# Patient Record
Sex: MALE | Race: WHITE | NOT HISPANIC OR LATINO | Employment: OTHER | RURAL
[De-identification: names, ages, dates, MRNs, and addresses within clinical notes are randomized per-mention and may not be internally consistent; named-entity substitution may affect disease eponyms.]

---

## 2023-12-21 NOTE — PROGRESS NOTES
[facility]  [address], [address]  [place], [place], [address]    [phone]  WWW.[facility]           OUTPATIENT CLINIC CONSULTATION NOTE    Patient care team:  Patient Care Team:  Boris Phillips DO as PCP - General (Family Medicine)    Requesting Provider and Reason for consultation: The patient is being seen today at the request of Boris Phillips DO for cardiac murmur.     Subjective:   Chief complaint:   Chief Complaint   Patient presents with    Heart Murmur    Fatigue    Chest Pain    Shortness of Breath    Leg Swelling         Frank Lackey is a 68 y.o. male.  Cardiac focused problem list:  Cardiac murmur  Noted by primary care provider approximately 2021  Referred to cardiology 2024  Hypertension   Hypercholesterolemia   Type 2 diabetes mellitus   Degenerative disc disease, lower back pain   Depression   Former tobacco use, quit in the 1990s    HPI:    Patient presents today in consultation for cardiac murmur.     Murmur first noted around 3 years ago.  No additional workup recommended at that time.  Sounds like murmur was soft/mild.    Has chest pain a couple times a month, left-sided, can last a minute, feels heavy.    No unusual dyspnea, swelling, palpitations.  Activity level is limited by lower back pain/degenerative disc disease.  Not very active    Mother had heart failure, oldest brother had open heart surgery but reason is not known (possibly had a murmur since birth), sister had CABG in her 60s, another brother had CABG in his 60s    No prior echo, stress test    Review of Systems:  As noted above in the HPI    PFSH:  Patient Active Problem List   Diagnosis    Heart murmur         Current Outpatient Medications:     atorvastatin (LIPITOR) 40 MG tablet, Take 1 tablet by mouth Daily., Disp: , Rfl:     lisinopril (PRINIVIL,ZESTRIL) 2.5 MG tablet, Take 1 tablet by mouth Daily., Disp: , Rfl:     sertraline (ZOLOFT) 100 MG tablet, , Disp: , Rfl:  "    No Known Allergies    Social History     Socioeconomic History    Marital status:    Tobacco Use    Smoking status: Former     Packs/day: 2     Types: Cigarettes     Start date:      Quit date:      Years since quittin.0    Smokeless tobacco: Never   Vaping Use    Vaping Use: Never used   Substance and Sexual Activity    Alcohol use: Yes     Comment: rarely    Drug use: Never    Sexual activity: Defer     Family History   Problem Relation Age of Onset    Heart failure Mother     Liver cancer Father     Heart disease Sister     Liver cancer Brother     Heart disease Brother          Objective:   Physical Exam:  /80 (BP Location: Left arm, Patient Position: Sitting)   Pulse 79   Ht 167.6 cm (66\")   Wt 87.5 kg (193 lb)   SpO2 93%   BMI 31.15 kg/m²   CONSTITUTIONAL: No acute distress  RESPIRATORY: Normal effort. Clear to auscultation bilaterally without wheezing or rales  CARDIOVASCULAR: Regular rate and rhythm with normal S1 and S2.  HELLEN at RUSB with radiation to the carotids  PERIPHERAL VASCULAR:  Normal radial pulse. There is no lower extremity edema bilaterally.      Labs:  Labs reviewed by myself    No results found for: \"CHOL\"  No results found for: \"TRIG\"  No results found for: \"HDL\"  No results found for: \"LDL\"  No components found for: \"LDLDIRECTC\"    Diagnostic Data:      ECG 12 Lead    Date/Time: 2024 12:05 PM  Performed by: Enmanuel Mon MD    Authorized by: Enmanuel Mon MD  Previous ECG: no previous ECG available  Rhythm: sinus rhythm              Assessment and Plan:     Heart murmur  Possible bilateral carotid bruit versus radiating murmur  -Echo and carotid duplex ordered, [facility]    Precordial chest pain   -If echo without evidence of severe AS or other severe valvular abnormalities, will recommend Lexiscan nuclear stress test at [facility].  Not able to exercise on a treadmill due to degenerative disc disease, lower back pain    Mixed " hyperlipidemia  -Continue atorvastatin    Essential hypertension  -Continue low-dose lisinopril.  Blood pressure runs on the lower side at home and other recent office visits.  Will consider up titration if with signs of more persistent elevation      - Return in about 1 year (around 1/5/2025) for Next scheduled follow-up with an ECG.

## 2024-01-05 ENCOUNTER — OFFICE VISIT (OUTPATIENT)
Dept: CARDIOLOGY | Facility: CLINIC | Age: 69
End: 2024-01-05
Payer: MEDICARE

## 2024-01-05 ENCOUNTER — HOSPITAL ENCOUNTER (OUTPATIENT)
Facility: HOSPITAL | Age: 69
Discharge: HOME | End: 2024-01-05
Payer: MEDICARE

## 2024-01-05 VITALS
HEIGHT: 66 IN | BODY MASS INDEX: 31.02 KG/M2 | SYSTOLIC BLOOD PRESSURE: 142 MMHG | HEART RATE: 79 BPM | WEIGHT: 193 LBS | OXYGEN SATURATION: 93 % | DIASTOLIC BLOOD PRESSURE: 80 MMHG

## 2024-01-05 DIAGNOSIS — R07.2 PRECORDIAL CHEST PAIN: ICD-10-CM

## 2024-01-05 DIAGNOSIS — R01.1 HEART MURMUR: Primary | ICD-10-CM

## 2024-01-05 DIAGNOSIS — R09.89 BILATERAL CAROTID BRUITS: ICD-10-CM

## 2024-01-05 DIAGNOSIS — E78.2 MIXED HYPERLIPIDEMIA: ICD-10-CM

## 2024-01-05 PROCEDURE — 93005 ELECTROCARDIOGRAM TRACING: CPT

## 2024-01-05 RX ORDER — SERTRALINE HYDROCHLORIDE 100 MG/1
TABLET, FILM COATED ORAL
COMMUNITY
Start: 2023-10-20

## 2024-01-05 RX ORDER — ATORVASTATIN CALCIUM 40 MG/1
1 TABLET, FILM COATED ORAL DAILY
COMMUNITY
Start: 2023-12-04

## 2024-01-05 RX ORDER — LISINOPRIL 2.5 MG/1
1 TABLET ORAL DAILY
COMMUNITY
Start: 2023-11-20

## 2024-02-14 ENCOUNTER — TELEPHONE (OUTPATIENT)
Dept: CARDIOLOGY | Facility: CLINIC | Age: 69
End: 2024-02-14

## 2024-02-14 NOTE — TELEPHONE ENCOUNTER
Caller: MILLIE MEJIAS    Relationship: Emergency Contact    Best call back number: [phone]     What orders are you requesting (i.e. lab or imaging): ECHO AND DUPLEX CAROTID    In what timeframe would the patient need to come in: ASAP    Where will you receive your lab/imaging services: [facility] [place]

## 2024-02-27 ENCOUNTER — HOSPITAL ENCOUNTER (OUTPATIENT)
Dept: NON INVASIVE DIAGNOSTICS | Facility: HOSPITAL | Age: 69
Discharge: HOME | End: 2024-02-27
Payer: MEDICARE

## 2024-02-27 ENCOUNTER — HOSPITAL ENCOUNTER (OUTPATIENT)
Dept: ULTRASOUND IMAGING | Facility: HOSPITAL | Age: 69
Discharge: HOME | End: 2024-02-27
Payer: MEDICARE

## 2024-02-27 ENCOUNTER — TELEPHONE (OUTPATIENT)
Dept: CARDIOLOGY | Facility: CLINIC | Age: 69
End: 2024-02-27
Payer: MEDICARE

## 2024-02-27 ENCOUNTER — OUTSIDE FACILITY SERVICE (OUTPATIENT)
Dept: CARDIOLOGY | Facility: CLINIC | Age: 69
End: 2024-02-27
Payer: MEDICARE

## 2024-02-27 DIAGNOSIS — R09.89 BRUIT: ICD-10-CM

## 2024-02-27 PROCEDURE — 93880 EXTRACRANIAL BILAT STUDY: CPT

## 2024-02-27 PROCEDURE — 93306 TTE W/DOPPLER COMPLETE: CPT

## 2024-02-27 NOTE — TELEPHONE ENCOUNTER
----- Message from Enmanuel Mon MD sent at 2/27/2024  1:51 PM EST -----  Carotid duplex ultrasound looks good.  Will follow-up again once echo completed.    ----- Message -----  From: Bren Bardales Incoming  Sent: 2/27/2024  12:10 PM EST  To: Enmanuel Mon MD

## 2024-03-11 ENCOUNTER — TELEPHONE (OUTPATIENT)
Dept: CARDIOLOGY | Facility: CLINIC | Age: 69
End: 2024-03-11
Payer: MEDICARE

## 2024-03-11 DIAGNOSIS — R07.2 PRECORDIAL CHEST PAIN: Primary | ICD-10-CM

## 2024-03-11 NOTE — TELEPHONE ENCOUNTER
----- Message from Enmanuel Mon MD sent at 3/8/2024  8:03 PM EST -----  Echo shows a severely calcified aortic valve.  Mild aortic stenosis.  This is the reason for his heart murmur.  We will need to watch this closely.  Not bad enough to warrant a fixed yet.  Need to continue to monitor risk factors.  Continue statin and blood pressure pill.  Due to recent symptoms and now having evaluated his heart murmur, we should proceed with a stress test to rule out other heart problems such as blockage since he has had chest pain.  Recommend a Lexiscan nuclear stress test at [facility].  Need to be in a monitored setting like [facility] for stress test due to his valve issues.

## 2024-03-11 NOTE — TELEPHONE ENCOUNTER
Discussed results and recommendations with patient and wife. All questions answered at this time, pt agreeable to plan.

## 2024-03-27 ENCOUNTER — HOSPITAL ENCOUNTER (OUTPATIENT)
Dept: CARDIOLOGY | Facility: HOSPITAL | Age: 69
Discharge: HOME | End: 2024-03-27
Admitting: INTERNAL MEDICINE
Payer: MEDICARE

## 2024-03-27 VITALS
HEIGHT: 66 IN | OXYGEN SATURATION: 95 % | DIASTOLIC BLOOD PRESSURE: 77 MMHG | WEIGHT: 193 LBS | RESPIRATION RATE: 16 BRPM | BODY MASS INDEX: 31.02 KG/M2 | HEART RATE: 60 BPM | SYSTOLIC BLOOD PRESSURE: 136 MMHG

## 2024-03-27 DIAGNOSIS — R07.2 PRECORDIAL CHEST PAIN: ICD-10-CM

## 2024-03-27 PROCEDURE — 93017 CV STRESS TEST TRACING ONLY: CPT

## 2024-03-27 PROCEDURE — 25010000002 REGADENOSON 0.4 MG/5ML SOLUTION: Performed by: INTERNAL MEDICINE

## 2024-03-27 PROCEDURE — 78452 HT MUSCLE IMAGE SPECT MULT: CPT

## 2024-03-27 PROCEDURE — A9500 TC99M SESTAMIBI: HCPCS | Performed by: INTERNAL MEDICINE

## 2024-03-27 PROCEDURE — 0 TECHNETIUM SESTAMIBI: Performed by: INTERNAL MEDICINE

## 2024-03-27 RX ORDER — REGADENOSON 0.08 MG/ML
0.4 INJECTION, SOLUTION INTRAVENOUS ONCE
Status: COMPLETED | OUTPATIENT
Start: 2024-03-27 | End: 2024-03-27

## 2024-03-27 RX ADMIN — TECHNETIUM TC 99M SESTAMIBI 1 DOSE: 1 INJECTION INTRAVENOUS at 08:21

## 2024-03-27 RX ADMIN — TECHNETIUM TC 99M SESTAMIBI 1 DOSE: 1 INJECTION INTRAVENOUS at 09:50

## 2024-03-27 RX ADMIN — REGADENOSON 0.4 MG: 0.08 INJECTION, SOLUTION INTRAVENOUS at 09:50

## 2024-03-28 LAB
BH CV REST NUCLEAR ISOTOPE DOSE: 9.9 MCI
BH CV STRESS BP STAGE 2: NORMAL
BH CV STRESS BP STAGE 4: NORMAL
BH CV STRESS COMMENTS STAGE 1: NORMAL
BH CV STRESS DOSE REGADENOSON STAGE 1: 0.4
BH CV STRESS DURATION MIN STAGE 1: 1
BH CV STRESS DURATION MIN STAGE 2: 1
BH CV STRESS DURATION MIN STAGE 3: 1
BH CV STRESS DURATION MIN STAGE 4: 1
BH CV STRESS DURATION SEC STAGE 1: 0
BH CV STRESS DURATION SEC STAGE 2: 0
BH CV STRESS DURATION SEC STAGE 3: 0
BH CV STRESS DURATION SEC STAGE 4: 0
BH CV STRESS HR STAGE 1: 63
BH CV STRESS HR STAGE 2: 86
BH CV STRESS HR STAGE 3: 84
BH CV STRESS HR STAGE 4: 82
BH CV STRESS NUCLEAR ISOTOPE DOSE: 33 MCI
BH CV STRESS O2 STAGE 1: 98
BH CV STRESS O2 STAGE 2: 98
BH CV STRESS O2 STAGE 3: 98
BH CV STRESS O2 STAGE 4: 94
BH CV STRESS PROTOCOL 1: NORMAL
BH CV STRESS RECOVERY BP: NORMAL MMHG
BH CV STRESS RECOVERY HR: 74 BPM
BH CV STRESS RECOVERY O2: 92 %
BH CV STRESS STAGE 1: 1
BH CV STRESS STAGE 2: 2
BH CV STRESS STAGE 3: 3
BH CV STRESS STAGE 4: 4
LV EF NUC BP: 64 %
MAXIMAL PREDICTED HEART RATE: 152 BPM
PERCENT MAX PREDICTED HR: 57.24 %
STRESS BASELINE BP: NORMAL MMHG
STRESS BASELINE HR: 60 BPM
STRESS O2 SAT REST: 95 %
STRESS PERCENT HR: 67 %
STRESS POST ESTIMATED WORKLOAD: 1 METS
STRESS POST EXERCISE DUR MIN: 4 MIN
STRESS POST EXERCISE DUR SEC: 0 SEC
STRESS POST O2 SAT PEAK: 98 %
STRESS POST PEAK BP: NORMAL MMHG
STRESS POST PEAK HR: 87 BPM
STRESS TARGET HR: 129 BPM

## 2024-03-29 ENCOUNTER — TELEPHONE (OUTPATIENT)
Dept: CARDIOLOGY | Facility: CLINIC | Age: 69
End: 2024-03-29
Payer: MEDICARE

## 2024-03-29 NOTE — TELEPHONE ENCOUNTER
----- Message from Enmanuel Mon MD sent at 3/28/2024  7:55 PM EDT -----  Stress test looks good.  Continue current plan.  ----- Message -----  From: Enmanuel Mon MD  Sent: 3/28/2024   4:01 PM EDT  To: Enmanuel Mon MD

## 2024-03-29 NOTE — TELEPHONE ENCOUNTER
Attempted to contact patient regarding results. No answer, no VM available. Will await return call.

## 2024-08-01 ENCOUNTER — TELEPHONE (OUTPATIENT)
Dept: SURGERY | Facility: CLINIC | Age: 69
End: 2024-08-01
Payer: MEDICARE

## 2024-08-01 NOTE — TELEPHONE ENCOUNTER
S/w the pt regarding a referral we received from Dr. Phillips for a Colon eval-pt is also requesting an eval for an EGD-he is aware of his apt date and time with Dr. Najera.

## 2024-08-19 NOTE — PROGRESS NOTES
Patient: Frank Lackey    YOB: 1955    Date: 2024    Primary Care Provider: Boris Phillips DO    Chief Complaint   Patient presents with    Colonoscopy    EGD       SUBJECTIVE:    History of present illness:  The patient is in the office today for evaluation for colonoscopy and EGD.  He states last colonoscopy was over 10 years ago. Father  from colon cancer. He states that he has abdominal pain and diarrhea. He states that he has dysphagia.  Strong family history of colon cancer, last colonoscopy about 18 years ago.  No rectal bleeding or anemia.  Occasional heartburn symptoms.    The following portions of the patient's history were reviewed and updated as appropriate: allergies, current medications, past family history, past medical history, past social history, past surgical history and problem list.      Review of Systems   Constitutional:  Negative for chills, fever and unexpected weight change.   HENT:  Positive for trouble swallowing. Negative for voice change.    Eyes:  Negative for visual disturbance.   Respiratory:  Negative for apnea, cough, chest tightness, shortness of breath and wheezing.    Cardiovascular:  Negative for chest pain, palpitations and leg swelling.   Gastrointestinal:  Positive for abdominal pain and diarrhea. Negative for abdominal distention, anal bleeding, blood in stool, constipation, nausea, rectal pain and vomiting.   Endocrine: Negative for cold intolerance and heat intolerance.   Genitourinary:  Negative for difficulty urinating, dysuria, flank pain, scrotal swelling and testicular pain.   Musculoskeletal:  Negative for back pain, gait problem and joint swelling.   Skin:  Negative for color change, rash and wound.   Neurological:  Negative for dizziness, syncope, speech difficulty, weakness, numbness and headaches.   Hematological:  Negative for adenopathy. Does not bruise/bleed easily.   Psychiatric/Behavioral:  Negative for confusion. The patient  "is not nervous/anxious.          Allergies:  No Known Allergies    Medications:    Current Outpatient Medications:     atorvastatin (LIPITOR) 40 MG tablet, Take 1 tablet by mouth Daily., Disp: , Rfl:     lisinopril (PRINIVIL,ZESTRIL) 2.5 MG tablet, Take 1 tablet by mouth Daily., Disp: , Rfl:     sertraline (ZOLOFT) 100 MG tablet, , Disp: , Rfl:     History:  Past Medical History:   Diagnosis Date    Anxiety     Hyperlipidemia     Hypertension        Past Surgical History:   Procedure Laterality Date    COLONOSCOPY         Family History   Problem Relation Age of Onset    Heart failure Mother     Cancer Father         colon    Liver cancer Father     Heart disease Sister     Liver cancer Brother     Heart disease Brother        Social History     Tobacco Use    Smoking status: Former     Current packs/day: 0.00     Average packs/day: 2.0 packs/day for 10.0 years (20.0 ttl pk-yrs)     Types: Cigarettes     Start date:      Quit date:      Years since quittin.6    Smokeless tobacco: Never   Vaping Use    Vaping status: Never Used   Substance Use Topics    Alcohol use: Yes     Comment: rarely    Drug use: Never        OBJECTIVE:    Vital Signs:   Vitals:    24 0833   BP: 133/66   Pulse: 71   Resp: 18   SpO2: 98%   Weight: 86.3 kg (190 lb 4.8 oz)   Height: 167.6 cm (66\")       Physical Exam:       Lungs-clear  Heart-regular rate without murmur  Abdomen-tender left lower quadrant.  No abdominal wall masses or hernias    Results Review:   I reviewed the patient's new clinical results.    Review of Systems was reviewed and confirmed as accurate as documented by the MA.    ASSESSMENT/PLAN:    1. Oral phase dysphagia    2. Gastroesophageal reflux disease, unspecified whether esophagitis present    3. History of colon polyps    4. Family history of colon cancer        Patient scheduled for EGD and colonoscopy.  Risk of bleeding and perforation discussed and patient agreeable.  Patient told avoid caffeine " alcohol and nicotine and take antacids as needed.  Also avoid hard foods and maintain a GI soft diet.          Electronically signed by Stan Najera MD  08/22/24

## 2024-08-22 ENCOUNTER — OFFICE VISIT (OUTPATIENT)
Dept: SURGERY | Facility: CLINIC | Age: 69
End: 2024-08-22
Payer: MEDICARE

## 2024-08-22 ENCOUNTER — PATIENT ROUNDING (BHMG ONLY) (OUTPATIENT)
Dept: SURGERY | Facility: CLINIC | Age: 69
End: 2024-08-22
Payer: MEDICARE

## 2024-08-22 VITALS
HEART RATE: 71 BPM | SYSTOLIC BLOOD PRESSURE: 133 MMHG | BODY MASS INDEX: 30.58 KG/M2 | DIASTOLIC BLOOD PRESSURE: 66 MMHG | RESPIRATION RATE: 18 BRPM | HEIGHT: 66 IN | WEIGHT: 190.3 LBS | OXYGEN SATURATION: 98 %

## 2024-08-22 DIAGNOSIS — R13.11 ORAL PHASE DYSPHAGIA: Primary | ICD-10-CM

## 2024-08-22 DIAGNOSIS — Z86.010 HISTORY OF COLON POLYPS: ICD-10-CM

## 2024-08-22 DIAGNOSIS — Z80.0 FAMILY HISTORY OF COLON CANCER: ICD-10-CM

## 2024-08-22 DIAGNOSIS — K21.9 GASTROESOPHAGEAL REFLUX DISEASE, UNSPECIFIED WHETHER ESOPHAGITIS PRESENT: ICD-10-CM

## 2024-08-22 PROCEDURE — 1160F RVW MEDS BY RX/DR IN RCRD: CPT | Performed by: SURGERY

## 2024-08-22 PROCEDURE — 1159F MED LIST DOCD IN RCRD: CPT | Performed by: SURGERY

## 2024-08-22 PROCEDURE — 99204 OFFICE O/P NEW MOD 45 MIN: CPT | Performed by: SURGERY

## 2024-08-22 RX ORDER — BISACODYL 5 MG/1
TABLET, DELAYED RELEASE ORAL
Qty: 4 TABLET | Refills: 0 | Status: SHIPPED | OUTPATIENT
Start: 2024-08-22

## 2024-08-22 RX ORDER — POLYETHYLENE GLYCOL 3350 17 G/17G
POWDER, FOR SOLUTION ORAL
Qty: 238 G | Refills: 0 | Status: SHIPPED | OUTPATIENT
Start: 2024-08-22

## 2024-08-22 NOTE — PROGRESS NOTES
August 22, 2024    Hello, may I speak with Frank Lackey?    My name is IRVIGN LEE    I am  with [facility]  [address]  [place] [place] [address] [phone].    Before we get started may I verify your date of birth? 1955    I am calling to officially welcome you to our practice and ask about your recent visit. Is this a good time to talk? yes    Tell me about your visit with us. What things went well?  EVERYTHING WAS GREAT!       We're always looking for ways to make our patients' experiences even better. Do you have recommendations on ways we may improve?  no    Overall were you satisfied with your first visit to our practice? yes       I appreciate you taking the time to speak with me today. Is there anything else I can do for you? no      Thank you, and have a great day.

## 2024-08-29 ENCOUNTER — OUTSIDE FACILITY SERVICE (OUTPATIENT)
Dept: SURGERY | Facility: CLINIC | Age: 69
End: 2024-08-29
Payer: MEDICARE

## 2024-08-29 ENCOUNTER — HOSPITAL ENCOUNTER (OUTPATIENT)
Facility: HOSPITAL | Age: 69
Setting detail: OUTPATIENT SURGERY
Discharge: HOME | End: 2024-08-29
Attending: SURGERY | Admitting: SURGERY
Payer: MEDICARE

## 2024-08-29 ENCOUNTER — ANESTHESIA (OUTPATIENT)
Dept: ENDOSCOPY | Facility: HOSPITAL | Age: 69
End: 2024-08-29
Payer: MEDICARE

## 2024-08-29 ENCOUNTER — ANESTHESIA EVENT (OUTPATIENT)
Dept: ENDOSCOPY | Facility: HOSPITAL | Age: 69
End: 2024-08-29
Payer: MEDICARE

## 2024-08-29 VITALS
TEMPERATURE: 97.6 F | HEART RATE: 55 BPM | OXYGEN SATURATION: 97 % | RESPIRATION RATE: 20 BRPM | BODY MASS INDEX: 29.82 KG/M2 | DIASTOLIC BLOOD PRESSURE: 76 MMHG | WEIGHT: 190 LBS | HEIGHT: 67 IN | SYSTOLIC BLOOD PRESSURE: 145 MMHG

## 2024-08-29 PROCEDURE — 6360000002 HC RX W HCPCS: Performed by: NURSE ANESTHETIST, CERTIFIED REGISTERED

## 2024-08-29 PROCEDURE — 7100000010 HC PHASE II RECOVERY - FIRST 15 MIN: Performed by: SURGERY

## 2024-08-29 PROCEDURE — 45385 COLONOSCOPY W/LESION REMOVAL: CPT | Performed by: SURGERY

## 2024-08-29 PROCEDURE — 3609012400 HC EGD TRANSORAL BIOPSY SINGLE/MULTIPLE: Performed by: SURGERY

## 2024-08-29 PROCEDURE — 2709999900 HC NON-CHARGEABLE SUPPLY: Performed by: SURGERY

## 2024-08-29 PROCEDURE — 3700000001 HC ADD 15 MINUTES (ANESTHESIA): Performed by: SURGERY

## 2024-08-29 PROCEDURE — 2580000003 HC RX 258: Performed by: SURGERY

## 2024-08-29 PROCEDURE — 3609010400 HC COLONOSCOPY POLYPECTOMY HOT BIOPSY: Performed by: SURGERY

## 2024-08-29 PROCEDURE — 7100000011 HC PHASE II RECOVERY - ADDTL 15 MIN: Performed by: SURGERY

## 2024-08-29 PROCEDURE — 3700000000 HC ANESTHESIA ATTENDED CARE: Performed by: SURGERY

## 2024-08-29 PROCEDURE — 43239 EGD BIOPSY SINGLE/MULTIPLE: CPT | Performed by: SURGERY

## 2024-08-29 PROCEDURE — 45384 COLONOSCOPY W/LESION REMOVAL: CPT | Performed by: SURGERY

## 2024-08-29 RX ORDER — LIDOCAINE HYDROCHLORIDE 20 MG/ML
INJECTION, SOLUTION EPIDURAL; INFILTRATION; INTRACAUDAL; PERINEURAL PRN
Status: DISCONTINUED | OUTPATIENT
Start: 2024-08-29 | End: 2024-08-29 | Stop reason: SDUPTHER

## 2024-08-29 RX ORDER — SODIUM CHLORIDE, SODIUM LACTATE, POTASSIUM CHLORIDE, CALCIUM CHLORIDE 600; 310; 30; 20 MG/100ML; MG/100ML; MG/100ML; MG/100ML
INJECTION, SOLUTION INTRAVENOUS CONTINUOUS
Status: DISCONTINUED | OUTPATIENT
Start: 2024-08-29 | End: 2024-08-29 | Stop reason: HOSPADM

## 2024-08-29 RX ORDER — SERTRALINE HYDROCHLORIDE 100 MG/1
100 TABLET, FILM COATED ORAL DAILY
COMMUNITY

## 2024-08-29 RX ORDER — PROPOFOL 10 MG/ML
INJECTION, EMULSION INTRAVENOUS PRN
Status: DISCONTINUED | OUTPATIENT
Start: 2024-08-29 | End: 2024-08-29 | Stop reason: SDUPTHER

## 2024-08-29 RX ADMIN — SODIUM CHLORIDE, POTASSIUM CHLORIDE, SODIUM LACTATE AND CALCIUM CHLORIDE: 600; 310; 30; 20 INJECTION, SOLUTION INTRAVENOUS at 08:43

## 2024-08-29 RX ADMIN — PROPOFOL 150 MG: 10 INJECTION, EMULSION INTRAVENOUS at 09:35

## 2024-08-29 RX ADMIN — LIDOCAINE HYDROCHLORIDE 40 MG: 20 INJECTION, SOLUTION EPIDURAL; INFILTRATION; INTRACAUDAL; PERINEURAL at 09:35

## 2024-08-29 NOTE — H&P
HISTORY & PHYSICAL      Patient Name: Jean Valentin      Medical Record Number: [id number]       Date of Service: 8/29/2024      I have reviewed the consult or history and physical from 8/22/2024.  There have been no significant changes in the patient's history or exam.  There have been no changes in the patient's medications.    History reviewed. No pertinent surgical history.     Past Medical History:   Diagnosis Date    Hypertension         Current Facility-Administered Medications   Medication Dose Route Frequency Provider Last Rate Last Admin    lactated ringers IV soln infusion   IntraVENous Continuous Milton Jackson MD 80 mL/hr at 08/29/24 0843 Restarted at 08/29/24 0932        Electronically signed by Milton Jackson MD on 8/29/2024 at 10:08 AM

## 2024-08-29 NOTE — PROGRESS NOTES
Pt awake.  No complaints of pain or nausea.  Abd soft.  +BS. +Flatus.  No trouble swallowing.  Side rails up x2.  Tolerates CL well.  Verbalizes understanding of d/c instructions.

## 2024-08-29 NOTE — ANESTHESIA PRE PROCEDURE
Department of Anesthesiology  Preprocedure Note       Name:  Jean Valentin   Age:  68 y.o.  :  1955                                          MRN:  [id number]         Date:  2024      Surgeon: Surgeon(s):  Milton Jackson MD    Procedure: Procedure(s):  ESOPHAGOGASTRODUODENOSCOPY BIOPSY  COLORECTAL CANCER SCREENING, NOT HIGH RISK    Medications prior to admission:   Prior to Admission medications    Medication Sig Start Date End Date Taking? Authorizing Provider   LISINOPRIL PO Take by mouth Daily   Yes Fabrizio Prince MD   sertraline (ZOLOFT) 100 MG tablet Take 1 tablet by mouth daily Pt unsure of dose   Yes Fabrizio Prince MD   NONFORMULARY Pt unsure of name of cholesterol med   Yes Fabrizio Prince MD       Current medications:    No current facility-administered medications for this encounter.       Allergies:  No Known Allergies    Problem List:  There is no problem list on file for this patient.      Past Medical History:        Diagnosis Date   • Hypertension        Past Surgical History:  History reviewed. No pertinent surgical history.    Social History:    Social History     Tobacco Use   • Smoking status: Former     Types: Cigarettes     Passive exposure: Past   • Smokeless tobacco: Not on file   Substance Use Topics   • Alcohol use: Not on file                                Counseling given: Not Answered      Vital Signs (Current):   Vitals:    24 0833   BP: (!) 147/84   Pulse: 68   Resp: 20   Temp: 98.4 °F (36.9 °C)   TempSrc: Oral   SpO2: 97%   Weight: 86.2 kg (190 lb)   Height: 1.702 m (5' 7\")                                              BP Readings from Last 3 Encounters:   24 (!) 147/84       NPO Status:                                                                                 BMI:   Wt Readings from Last 3 Encounters:   24 86.2 kg (190 lb)     Body mass index is 29.76 kg/m².    CBC: No results found for: \"WBC\", \"RBC\", \"HGB\", \"HCT\", \"MCV\", \"RDW\",  \"PLT\"    CMP: No results found for: \"NA\", \"K\", \"CL\", \"CO2\", \"BUN\", \"CREATININE\", \"GFRAA\", \"AGRATIO\", \"LABGLOM\", \"GLUCOSE\", \"GLU\", \"CALCIUM\", \"BILITOT\", \"ALKPHOS\", \"AST\", \"ALT\"    POC Tests: No results for input(s): \"POCGLU\", \"POCNA\", \"POCK\", \"POCCL\", \"POCBUN\", \"POCHEMO\", \"POCHCT\" in the last 72 hours.    Coags: No results found for: \"PROTIME\", \"INR\", \"APTT\"    HCG (If Applicable): No results found for: \"PREGTESTUR\", \"PREGSERUM\", \"HCG\", \"HCGQUANT\"     ABGs: No results found for: \"PHART\", \"PO2ART\", \"UGI5MWM\", \"UVE8AUZ\", \"BEART\", \"Z8QPRIZY\"     Type & Screen (If Applicable):  No results found for: \"LABABO\"    Drug/Infectious Status (If Applicable):  No results found for: \"HIV\", \"HEPCAB\"    COVID-19 Screening (If Applicable): No results found for: \"COVID19\"        Anesthesia Evaluation  Patient summary reviewed and Nursing notes reviewed  Airway: Mallampati: II          Dental:          Pulmonary:                              Cardiovascular:    (+) hypertension: mild                  Neuro/Psych:               GI/Hepatic/Renal:             Endo/Other:                     Abdominal:             Vascular:          Other Findings:         Anesthesia Plan      MAC     ASA 2       Induction: intravenous.      Anesthetic plan and risks discussed with patient.      Plan discussed with AGATHA.                Denzel Christine, THOMAS - AGATHA   8/29/2024

## 2024-08-29 NOTE — ANESTHESIA POSTPROCEDURE EVALUATION
Department of Anesthesiology  Postprocedure Note    Patient: Jean Valentin  MRN: [id number]  YOB: 1955  Date of evaluation: 8/29/2024    Procedure Summary       Date: 08/29/24 Room / Location: [place] [address] / [facility]    Anesthesia Start: 0932 Anesthesia Stop: 0955    Procedures:       ESOPHAGOGASTRODUODENOSCOPY BIOPSY      COLORECTAL CANCER SCREENING, NOT HIGH RISK Diagnosis:       Oral phase dysphagia      Gastroesophageal reflux disease, unspecified whether esophagitis present      Hx of colonic polyps      Family history of colon cancer    Surgeons: Milton Jackson MD Responsible Provider: Denzel Christine, APRN - CRNA    Anesthesia Type: MAC ASA Status: 2            Anesthesia Type: No value filed.    Amilcar Phase I: Amilcar Score: 10    Amilcar Phase II:      Anesthesia Post Evaluation    Patient location during evaluation: PACU  Level of consciousness: awake and alert and awake  Pain score: 1  Airway patency: patent  Nausea & Vomiting: no nausea and no vomiting  Cardiovascular status: blood pressure returned to baseline  Respiratory status: acceptable  Hydration status: euvolemic  Pain management: adequate    No notable events documented.

## 2024-08-29 NOTE — PROGRESS NOTES
Pt arrives ambulatory.  No complaints noted.  Verifies he is here for EGD and colonoscopy with Dr. Jackson.  States prep was effective.  Verbalizes understanding of procedure.  Consent on chart.  Verbalizes understanding of d/c instructions.  HR regular.  Lungs clear.  +BS.  Denies chest pain or SOA.  States he is here for evaluation of abdominal pain, trouble swallowing, and weight loss over the last couple of years.  States wife will drive him home post-procedure.  Side rails up x 2.

## 2024-08-29 NOTE — OP NOTE
Colonoscopy & Esophagogastroduodenoscopy Note    Patient:   Jean Valentin      YOB: 1955      Facility:   [facility]      Referring/PCP: Antony Stewart    Procedure:   Colonoscopy with polypectomy x 2 via hot snare and polypectomy x 2 via hot forcep;     Esophagogastroduodenoscopy with Biopsies     Procedure Date:   08/29/24     Endoscopist:  Milton Jackson MD, MD, FACS    Preoperative Diagnosis: History of colon polyps, GERD with dysphagia and hiatal hernia    Postoperative Diagnosis: Large hiatal hernia esophagitis and gastritis, polyps x 2 in transverse colon polyps x 2 rectosigmoid region.    Anesthesia: Sedation      Estimated blood loss: None    Complications: None    Description of Procedure:  Informed consent was obtained from the patient after explanation of the procedure including indications, description of the procedure,  benefits and possible risks and complications of the procedure, and alternatives. Questions were answered.  The patient's history was reviewed and a directed physical examination was performed prior to the procedure.    Patient was monitored throughout the procedure with pulse oximetry and periodic assessment of vital signs. Patient was sedated as noted above. With the patient initially in the left lateral decubitus position, a digital rectal examination was performed and revealed grade 2 internal hemorrhoids.  The Olympus video colonoscope was placed in the patient's rectum and advanced without difficulty  to the cecum. The prep was suboptimal.  Examination of the mucosa was performed during both introduction and withdrawal of the colonoscope. Retroflexed view of the rectum was performed. Withdrawal time was 10 minutes.     Findings:     1.  Patient had 2 polyps in the transverse colon measuring 7 mm each, removed with hot snare and retrieved.  Patient also had 2 polyps in the sigmoid colon that were small and removed

## 2024-09-04 NOTE — PROGRESS NOTES
"Patient: Frank Lackey  YOB: 1955    Date: 09/05/2024    Primary Care Provider: Boris Phillips DO    Chief Complaint   Patient presents with    Follow-up       History: The patient is in the office today in follow up from colon/EGD.  Pathology was reviewed and indicated H Pylori, tubular adenoma, and sessile serrated adenoma.  Patient also had H. pylori, symptoms significant epigastric discomfort and heartburn.    The following portions of the patient's history were reviewed and updated as appropriate: allergies, current medications, past family history, past medical history, past social history, past surgical history and problem list.    Review of Systems   Constitutional:  Negative for chills, fever and unexpected weight change.   HENT:  Negative for trouble swallowing and voice change.    Eyes:  Negative for visual disturbance.   Respiratory:  Negative for apnea, cough, chest tightness, shortness of breath and wheezing.    Cardiovascular:  Negative for chest pain, palpitations and leg swelling.   Gastrointestinal:  Negative for abdominal distention, abdominal pain, anal bleeding, blood in stool, constipation, diarrhea, nausea, rectal pain and vomiting.   Endocrine: Negative for cold intolerance and heat intolerance.   Genitourinary:  Negative for difficulty urinating, dysuria, flank pain, scrotal swelling and testicular pain.   Musculoskeletal:  Negative for back pain, gait problem and joint swelling.   Skin:  Negative for color change, rash and wound.   Neurological:  Negative for dizziness, syncope, speech difficulty, weakness, numbness and headaches.   Hematological:  Negative for adenopathy. Does not bruise/bleed easily.   Psychiatric/Behavioral:  Negative for confusion. The patient is not nervous/anxious.        Vital Signs  Vitals:    09/05/24 0854   BP: 133/69   Pulse: 69   SpO2: 97%   Weight: 86.2 kg (190 lb)   Height: 167.6 cm (66\")       Allergies:  No Known " Allergies    Medications:    Current Outpatient Medications:     atorvastatin (LIPITOR) 40 MG tablet, Take 1 tablet by mouth Daily., Disp: , Rfl:     bisacodyl 5 MG EC tablet, 4 tablets to be taken at time directed on instructions the day prior to colonoscopy, Disp: 4 tablet, Rfl: 0    lisinopril (PRINIVIL,ZESTRIL) 2.5 MG tablet, Take 1 tablet by mouth Daily., Disp: , Rfl:     polyethylene glycol (MIRALAX) 17 GM/SCOOP powder, Mix 238g powder with 64 oz of clear liquid. Starting at 5pm drink 80z every 10-15 minutes until consumed., Disp: 238 g, Rfl: 0    sertraline (ZOLOFT) 100 MG tablet, , Disp: , Rfl:     Physical Exam:    Abdomen-soft, nondistended nontender     Results Review:   I reviewed the patient's new clinical results.     Review of Systems was reviewed and confirmed as accurate as documented by the MA.    ASSESSMENT/PLAN:    1. 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 Adenomatous polyp of transverse colon       Patient started on Prevpac for 14 days, avoid caffeine alcohol nicotine.  Also scheduled for colonoscopy repeat in 3 years for serrated adenoma.  Patient understands agreeable.    Electronically signed by Stan Najera MD  09/05/24

## 2024-09-05 ENCOUNTER — OFFICE VISIT (OUTPATIENT)
Dept: SURGERY | Facility: CLINIC | Age: 69
End: 2024-09-05
Payer: MEDICARE

## 2024-09-05 VITALS
HEIGHT: 66 IN | OXYGEN SATURATION: 97 % | SYSTOLIC BLOOD PRESSURE: 133 MMHG | WEIGHT: 190 LBS | HEART RATE: 69 BPM | DIASTOLIC BLOOD PRESSURE: 69 MMHG | BODY MASS INDEX: 30.53 KG/M2

## 2024-09-05 DIAGNOSIS — K29.60 GASTRITIS DUE TO HELICOBACTER HEILMANNII: Primary | ICD-10-CM

## 2024-09-05 DIAGNOSIS — B96.89 GASTRITIS DUE TO HELICOBACTER HEILMANNII: Primary | ICD-10-CM

## 2024-09-05 DIAGNOSIS — D12.3 ADENOMATOUS POLYP OF TRANSVERSE COLON: ICD-10-CM

## 2024-09-05 PROCEDURE — 1160F RVW MEDS BY RX/DR IN RCRD: CPT | Performed by: SURGERY

## 2024-09-05 PROCEDURE — 1159F MED LIST DOCD IN RCRD: CPT | Performed by: SURGERY

## 2024-09-05 PROCEDURE — 99213 OFFICE O/P EST LOW 20 MIN: CPT | Performed by: SURGERY

## 2024-09-05 RX ORDER — LANSOPRAZOLE, AMOXICILLIN, CLARITHROMYCIN 30-500-500
KIT ORAL 2 TIMES DAILY
Qty: 28 EACH | Refills: 0 | Status: SHIPPED | OUTPATIENT
Start: 2024-09-05

## (undated) DEVICE — DISPOSABLE HOT BIOPSY FORCEPS: Brand: DISPOSABLE HOT BIOPSY FORCEPS

## (undated) DEVICE — SINGLE-USE POLYPECTOMY SNARE: Brand: CAPTIVATOR